# Patient Record
Sex: MALE | Race: WHITE | URBAN - METROPOLITAN AREA
[De-identification: names, ages, dates, MRNs, and addresses within clinical notes are randomized per-mention and may not be internally consistent; named-entity substitution may affect disease eponyms.]

---

## 2018-01-08 ENCOUNTER — APPOINTMENT (RX ONLY)
Dept: URBAN - METROPOLITAN AREA CLINIC 39 | Facility: CLINIC | Age: 18
Setting detail: DERMATOLOGY
End: 2018-01-08

## 2018-01-08 DIAGNOSIS — L30.0 NUMMULAR DERMATITIS: ICD-10-CM

## 2018-01-08 PROBLEM — L20.84 INTRINSIC (ALLERGIC) ECZEMA: Status: ACTIVE | Noted: 2018-01-08

## 2018-01-08 PROBLEM — J45.909 UNSPECIFIED ASTHMA, UNCOMPLICATED: Status: ACTIVE | Noted: 2018-01-08

## 2018-01-08 PROBLEM — L55.1 SUNBURN OF SECOND DEGREE: Status: ACTIVE | Noted: 2018-01-08

## 2018-01-08 PROCEDURE — ? COUNSELING

## 2018-01-08 PROCEDURE — ? PRESCRIPTION

## 2018-01-08 PROCEDURE — 99202 OFFICE O/P NEW SF 15 MIN: CPT

## 2018-01-08 PROCEDURE — ? KOH PREP

## 2018-01-08 PROCEDURE — 87220 TISSUE EXAM FOR FUNGI: CPT

## 2018-01-08 RX ORDER — HALOBETASOL PROPIONATE 0.5 MG/G
OINTMENT TOPICAL
Qty: 1 | Refills: 1 | Status: ERX | COMMUNITY
Start: 2018-01-08

## 2018-01-08 RX ADMIN — HALOBETASOL PROPIONATE: 0.5 OINTMENT TOPICAL at 00:00

## 2018-01-08 ASSESSMENT — LOCATION ZONE DERM: LOCATION ZONE: ARM

## 2018-01-08 ASSESSMENT — LOCATION SIMPLE DESCRIPTION DERM
LOCATION SIMPLE: RIGHT FOREARM
LOCATION SIMPLE: LEFT FOREARM

## 2018-01-08 ASSESSMENT — LOCATION DETAILED DESCRIPTION DERM
LOCATION DETAILED: RIGHT VENTRAL DISTAL FOREARM
LOCATION DETAILED: LEFT VENTRAL DISTAL FOREARM

## 2018-01-08 NOTE — HPI: RASH
How Severe Is Your Rash?: moderate
Is This A New Presentation, Or A Follow-Up?: Rash
Additional History: He took an antibiotic and prednisone for strep pharyngitis. Cannot recall influence on lesions. Has used topical clotrimazole and possibly another topical antifungal but does not think any were topical steroids

## 2018-01-08 NOTE — PROCEDURE: COUNSELING
Detail Level: Detailed
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 If recalcitrant, biopsy to r/o atypical (para-)psoriasis and less likely EAC. This does not appear as GA, mycobacterial or neoplastic

## 2018-01-08 NOTE — PROCEDURE: KOH PREP
Koh Intro Text (From The.....): A KOH prep was ordered and evaluated by Dr. Tyson Alexander
Showing: negative findings within normal realm
Detail Level: Detailed

## 2020-09-24 ENCOUNTER — HOSPITAL ENCOUNTER (OUTPATIENT)
Dept: HOSPITAL 75 - RAD | Age: 20
End: 2020-09-24
Attending: NURSE PRACTITIONER
Payer: COMMERCIAL

## 2020-09-24 DIAGNOSIS — S62.396A: Primary | ICD-10-CM
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
## 2020-09-24 PROCEDURE — 73130 X-RAY EXAM OF HAND: CPT

## 2020-09-24 NOTE — DIAGNOSTIC IMAGING REPORT
INDICATION: Status post recent trauma. Now with pain and

swelling. 



COMPARISON: None.



FINDINGS: 3 radiographic views of the right hand were obtained

and show acute transverse oriented fracture of the distal 5th

metacarpal. There is mild angulation with the apex projecting

posteriorly. There is no intra-articular extension or other

significant displacement of fracture fragments. No other acute

osseous abnormalities are seen. Remaining joint spaces are

intact. No unexpected radiopaque foreign bodies are identified.



IMPRESSION:

1. Acute fracture of the distal 5th metacarpal as described

above.



Dictated by: 



  Dictated on workstation # [id number]